# Patient Record
Sex: FEMALE | Race: WHITE | NOT HISPANIC OR LATINO | URBAN - NONMETROPOLITAN AREA
[De-identification: names, ages, dates, MRNs, and addresses within clinical notes are randomized per-mention and may not be internally consistent; named-entity substitution may affect disease eponyms.]

---

## 2021-10-22 PROBLEM — K21.9 GASTROESOPHAGEAL REFLUX DISEASE: Chronic | Status: CHRONIC | Noted: 2021-10-22

## 2021-10-22 PROBLEM — I48.0 PAROXYSMAL ATRIAL FIBRILLATION (CMS/HCC): Chronic | Status: CHRONIC | Noted: 2021-10-22

## 2021-10-22 PROBLEM — E11.8: Chronic | Status: CHRONIC | Noted: 2021-10-22

## 2021-10-22 PROBLEM — E66.01 MORBID OBESITY (CMS/HCC): Chronic | Status: CHRONIC | Noted: 2021-10-22

## 2021-10-22 PROBLEM — K21.00 GASTRO-ESOPHAGEAL REFLUX DISEASE WITH ESOPHAGITIS: Chronic | Status: CHRONIC | Noted: 2021-10-22

## 2021-10-22 PROBLEM — E11.9 TYPE 2 DIABETES MELLITUS (CMS/HCC): Chronic | Status: CHRONIC | Noted: 2021-10-22

## 2021-10-22 PROBLEM — I47.20 VENTRICULAR TACHYCARDIA (CMS/HCC): Chronic | Status: CHRONIC | Noted: 2021-10-22

## 2021-10-22 PROBLEM — C50.511: Chronic | Status: CHRONIC | Noted: 2021-10-22

## 2022-04-29 PROBLEM — Z85.3 HISTORY OF RIGHT BREAST CANCER: Chronic | Status: CHRONIC | Noted: 2022-04-29

## 2023-05-12 ENCOUNTER — OFFICE (OUTPATIENT)
Dept: URBAN - NONMETROPOLITAN AREA CLINIC 1 | Facility: CLINIC | Age: 63
End: 2023-05-12

## 2023-05-12 VITALS
SYSTOLIC BLOOD PRESSURE: 137 MMHG | HEART RATE: 75 BPM | WEIGHT: 211 LBS | DIASTOLIC BLOOD PRESSURE: 84 MMHG | HEIGHT: 67 IN

## 2023-05-12 DIAGNOSIS — Z86.010 PERSONAL HISTORY OF COLONIC POLYPS: ICD-10-CM

## 2023-05-12 DIAGNOSIS — E11.9 TYPE 2 DIABETES MELLITUS WITHOUT COMPLICATIONS: ICD-10-CM

## 2023-05-12 DIAGNOSIS — Z79.02 LONG TERM (CURRENT) USE OF ANTITHROMBOTICS/ANTIPLATELETS: ICD-10-CM

## 2023-05-12 DIAGNOSIS — I48.91 UNSPECIFIED ATRIAL FIBRILLATION: ICD-10-CM

## 2023-05-12 DIAGNOSIS — R10.13 EPIGASTRIC PAIN: ICD-10-CM

## 2023-05-12 DIAGNOSIS — K21.9 GASTRO-ESOPHAGEAL REFLUX DISEASE WITHOUT ESOPHAGITIS: ICD-10-CM

## 2023-05-12 PROCEDURE — 99204 OFFICE O/P NEW MOD 45 MIN: CPT | Performed by: NURSE PRACTITIONER

## 2023-05-12 NOTE — SERVICEHPINOTES
She comes in today to schedule a colonoscopy and an EGD. She has a history of adenomatous colon polyps and is due for surveillance. She is also having pain across her upper abdomen and terrible sulfur smelling burps. No dark stools. She takes pantoprazole 20 mg for GERD. Chronic illnesses include atrial fibrillation and Plavix, hypertension, and diabetes.br
br Colonoscopy  &amp EGD 9/18/2019 by Dr. Montelongo-
valentina br Findings: 5 millimeter ascending broad-based polyp removed with cold snare technique and retrieved. Four 3 to 5 millimeter broad-based transverse colon polyps removed with cold snare technique and retrieved. Pan diverticular disease. Moderate to severe in the sigmoid and rectosigmoid colon and moderate severity throughout the remainder of the colon. Small internal hemorrhoids
br 
br Findings:brEsophagus: Gastroesophageal junction at 40 centimeters. The esophagus appears normal without evidence of mass, ulceration, stricture, or varices. No significant evidence of reflux induced changes.	brGastroesophageal junction: 2 to 3 centimeter sliding hiatal hernia.brStomach: Mild-to-moderate amount of petechial type gastric inflammation throughout the entirety of the gastric mucosa. Forcep biopsies were taken from the antrum, incisura, distal gastric body. No evidence of mass, ulceration, or varices.brPylorus: Normal.brDuodenum: Normal.br 
br Final Pathologic Diagnosis:br   1.   STOMACH, BIOPSY:br     Mild chronic antral gastritis Helicobacter pylori immunostain is negative for microorganisms. br   2.   transverse colon, polypectomy:br     Tubular adenoma, multiple fragments. br   3.   ascending colon, polypectomy:br     Tubular adenoma

## 2023-05-12 NOTE — SERVICENOTES
Hold Plavix 5 days prior to the procedure.
The risks for her procedures were discussed and she agrees to proceed.
The bowel prep was prescribed and instructions were provided to her.
Follow-up will depend on the results of her procedures, at the discretion of the physician

## 2023-10-19 ENCOUNTER — ON CAMPUS - OUTPATIENT (OUTPATIENT)
Dept: URBAN - NONMETROPOLITAN AREA HOSPITAL 34 | Facility: HOSPITAL | Age: 63
End: 2023-10-19
Payer: MEDICARE

## 2023-10-19 DIAGNOSIS — K57.30 DIVERTICULOSIS OF LARGE INTESTINE WITHOUT PERFORATION OR ABS: ICD-10-CM

## 2023-10-19 DIAGNOSIS — K21.9 GASTRO-ESOPHAGEAL REFLUX DISEASE WITHOUT ESOPHAGITIS: ICD-10-CM

## 2023-10-19 DIAGNOSIS — Z86.010 PERSONAL HISTORY OF COLONIC POLYPS: ICD-10-CM

## 2023-10-19 DIAGNOSIS — K22.89 OTHER SPECIFIED DISEASE OF ESOPHAGUS: ICD-10-CM

## 2023-10-19 DIAGNOSIS — D12.5 BENIGN NEOPLASM OF SIGMOID COLON: ICD-10-CM

## 2023-10-19 PROCEDURE — 43235 EGD DIAGNOSTIC BRUSH WASH: CPT | Mod: 51 | Performed by: INTERNAL MEDICINE

## 2023-10-19 PROCEDURE — 45385 COLONOSCOPY W/LESION REMOVAL: CPT | Performed by: INTERNAL MEDICINE

## 2023-11-30 ENCOUNTER — OFFICE (OUTPATIENT)
Dept: URBAN - NONMETROPOLITAN AREA CLINIC 1 | Facility: CLINIC | Age: 63
End: 2023-11-30

## 2023-11-30 VITALS
DIASTOLIC BLOOD PRESSURE: 72 MMHG | HEART RATE: 78 BPM | WEIGHT: 206 LBS | SYSTOLIC BLOOD PRESSURE: 120 MMHG | HEIGHT: 67 IN

## 2023-11-30 DIAGNOSIS — I48.91 UNSPECIFIED ATRIAL FIBRILLATION: ICD-10-CM

## 2023-11-30 DIAGNOSIS — E11.9 TYPE 2 DIABETES MELLITUS WITHOUT COMPLICATIONS: ICD-10-CM

## 2023-11-30 DIAGNOSIS — Z86.010 PERSONAL HISTORY OF COLONIC POLYPS: ICD-10-CM

## 2023-11-30 DIAGNOSIS — K21.9 GASTRO-ESOPHAGEAL REFLUX DISEASE WITHOUT ESOPHAGITIS: ICD-10-CM

## 2023-11-30 DIAGNOSIS — I10 ESSENTIAL (PRIMARY) HYPERTENSION: ICD-10-CM

## 2023-11-30 PROCEDURE — 99213 OFFICE O/P EST LOW 20 MIN: CPT | Performed by: NURSE PRACTITIONER

## 2023-11-30 NOTE — SERVICEHPINOTES
In for follow-up upper abdomen discomfort and sulfur burps.  She went for EGD and colonoscopy and she continues to take pantoprazole 20 mg daily.  She hasnt had any more sulfur burping, but I reminded her that Mounjaro can cause it. She is advised to use a fiber supplement to promote bowel motility, and to help prevent diverticulitis. br
br EGD/ colon 10/19/23 by Dr. Montelongo-
br   Findings:brEsophagus: Mildly irregular Z-line. Esophagus otherwise appears normal without evidence of active reflux induced changes, ulcer, mass, stricture, varices, or Delgado's mucosa.brGastroesophageal junction: 1 centimeter sliding hiatal hernia.brStomach: Normal. No evidence of significant mucosal inflammatory changes, erosion, ulcer, mass, or varices.brPylorus: Normal. Patent.brDuodenum: Normal
br
br Colon Findings: Pan diverticular disease. Severe in the sigmoid colon and moderate throughout the remainder of the colon. This resulted in a tortuous and fixed sigmoid colon. 5 millimeter broad-based sigmoid colon polyp was removed with cold snare technique and retrieved. Colon otherwise appeared normal. 
brFinal Pathologic Diagnosis:br   ENDOSCOPIC BIOPSY, "SIGMOID COLON POLYP:"br   - Fragments of tubular adenoma and plant matter. 
br
Repeat colonoscopy in 5 years.